# Patient Record
Sex: FEMALE | Race: WHITE | Employment: PART TIME | URBAN - METROPOLITAN AREA
[De-identification: names, ages, dates, MRNs, and addresses within clinical notes are randomized per-mention and may not be internally consistent; named-entity substitution may affect disease eponyms.]

---

## 2024-03-27 ENCOUNTER — OFFICE VISIT (OUTPATIENT)
Age: 59
End: 2024-03-27
Payer: COMMERCIAL

## 2024-03-27 VITALS
OXYGEN SATURATION: 98 % | HEART RATE: 86 BPM | HEIGHT: 60 IN | BODY MASS INDEX: 24.84 KG/M2 | DIASTOLIC BLOOD PRESSURE: 80 MMHG | WEIGHT: 126.5 LBS | SYSTOLIC BLOOD PRESSURE: 140 MMHG

## 2024-03-27 DIAGNOSIS — Z76.89 ENCOUNTER TO ESTABLISH CARE: Primary | ICD-10-CM

## 2024-03-27 PROCEDURE — 93000 ELECTROCARDIOGRAM COMPLETE: CPT | Performed by: INTERNAL MEDICINE

## 2024-03-27 PROCEDURE — 99203 OFFICE O/P NEW LOW 30 MIN: CPT | Performed by: INTERNAL MEDICINE

## 2024-03-27 RX ORDER — ALBUTEROL SULFATE 90 UG/1
AEROSOL, METERED RESPIRATORY (INHALATION)
COMMUNITY

## 2024-03-27 RX ORDER — MECLIZINE HYDROCHLORIDE 25 MG/1
TABLET ORAL
COMMUNITY
Start: 2021-09-03

## 2024-03-27 RX ORDER — IPRATROPIUM BROMIDE AND ALBUTEROL SULFATE 2.5; .5 MG/3ML; MG/3ML
SOLUTION RESPIRATORY (INHALATION)
COMMUNITY

## 2024-03-27 RX ORDER — BUDESONIDE AND FORMOTEROL FUMARATE DIHYDRATE 160; 4.5 UG/1; UG/1
AEROSOL RESPIRATORY (INHALATION)
COMMUNITY

## 2024-03-27 RX ORDER — FLUCONAZOLE 150 MG/1
TABLET ORAL
COMMUNITY

## 2024-03-27 RX ORDER — CYCLOBENZAPRINE HCL 10 MG
TABLET ORAL
COMMUNITY

## 2024-03-27 RX ORDER — MONTELUKAST SODIUM 10 MG/1
10 TABLET ORAL DAILY
COMMUNITY
Start: 2024-03-03

## 2024-03-27 ASSESSMENT — PATIENT HEALTH QUESTIONNAIRE - PHQ9
1. LITTLE INTEREST OR PLEASURE IN DOING THINGS: NOT AT ALL
SUM OF ALL RESPONSES TO PHQ QUESTIONS 1-9: 0

## 2024-03-27 NOTE — PROGRESS NOTES
[facility] [place]:   [phone]    History of Present Illness:   is a 57 yo F with a history of tobacco use, quit in 1995, family history of sister with aortic valve replacement, referred by Dr. Steele for cardiac evaluation.  She is here due to palpitations. She used to have these three to four times a week, but this past weekend she had 'something like 400' of them from Friday to Monday. She says they last a few seconds, felt uncomfortable, like \"fluttering\" and \"jumping jacks\".  There was not associated shortness of breath or lightheadedness, dizziness. She does note she was under a lot of stress about paying the rent and after stress resolved, her symptoms resolved too. She does drink about two cups of coffee a day, but did probably have an extra cup of coffee at the time.  Separate from this, she denies any exertional chest pain.  A few weeks ago she did have some left sided chest discomfort that was muscular and improved with Flexeril.  She has had some allergies and nasal congestion.  On exam she has no rales, but she does have some rhonchi, likely secondary to her congestion/allergies.  Her EKG was normal sinus rhythm.  Soc hx. Quit tobacco 1995  Fam hx. Mom with CHF, sister aortic valve replacement 69 yo  Assessment and Plan:   1. Palpitations.  She has not had any palpitations since her stress went away and wearing a monitor right now may be very low yield.  Will have her flag her symptoms and if they recur and happen frequently enough, then would have her wear a heart monitor, possibly a one month loop monitor, and we discussed this and she will reach out to us if her symptoms recur.  2. Tobacco use. Quit in 1995.  3. Family history of sister with valve replacement.  4. Benign fibroid tumor, status post hysterectomy in 2020.        She  has no past medical history on file.    All other systems negative except as above.     PE  Vitals:    03/27/24 0825   BP: (!) 140/80   Site: Left Upper

## 2024-04-04 ENCOUNTER — TELEPHONE (OUTPATIENT)
Age: 59
End: 2024-04-04

## 2024-04-04 DIAGNOSIS — R00.2 PALPITATIONS: Primary | ICD-10-CM

## 2024-04-04 NOTE — TELEPHONE ENCOUNTER
Enrolled with [employer] - Ordered and being shipped to patient's home address on file.  ETA within 5-7 business days.         Message  Received: Today  Grisel Soto, RN  [facility]  Please order a one month loop with sensitive stickers for palpitations per Dr. Roberts.    Thanks

## 2024-04-04 NOTE — TELEPHONE ENCOUNTER
Patient is calling because she said her heart has been  fluttering constantly. Patient said that this has a occurred 4 days.    Patient would like to proceed with getting the monitor per conversation with the doctor.Patient wants the doctor to put the order in for this.Please assist.    [phone]

## 2024-04-04 NOTE — TELEPHONE ENCOUNTER
Telephone call made to patient. Two patient identifiers verified. The patient said she is experience a lot of fluttering lately and she wanted the monitor. Monitor ordered. The patient said Dr. Steele wanted her to get an echocardiogram since her sister had bad valves. That's why she originally came and she didn't know if dr. Roberts was ordering it. I don't see an order. Will follow up with Dr. Roberts.

## 2024-04-05 NOTE — TELEPHONE ENCOUNTER
Per Dr. Roberts:    Lets set up an echo (for palpitations). Follow up can be determined if needed after the echo. thx     Identifiers x 2. Informed of the above.      Future Appointments   Date Time Provider Department Center   4/15/2024  8:00 AM [facility]